# Patient Record
(demographics unavailable — no encounter records)

---

## 2024-11-18 NOTE — ASSESSMENT
[FreeTextEntry1] : ASHD\par  Hypertension\par  Hyperlipidemia\par  PVC's\par  APC's\par  Bladder cancer

## 2024-11-18 NOTE — PHYSICAL EXAM
[General Appearance - Well Developed] : well developed [Normal Appearance] : normal appearance [General Appearance - Well Nourished] : well nourished [General Appearance - In No Acute Distress] : no acute distress [Normal Conjunctiva] : the conjunctiva exhibited no abnormalities [Normal Oropharynx] : normal oropharynx [Normal Jugular Venous A Waves Present] : normal jugular venous A waves present [Respiration, Rhythm And Depth] : normal respiratory rhythm and effort [Exaggerated Use Of Accessory Muscles For Inspiration] : no accessory muscle use [Auscultation Breath Sounds / Voice Sounds] : lungs were clear to auscultation bilaterally [Heart Rate And Rhythm] : heart rate and rhythm were normal [Heart Sounds] : normal S1 and S2 [Arterial Pulses Normal] : the arterial pulses were normal [Edema] : no peripheral edema present [Abdomen Soft] : soft [Abdomen Tenderness] : non-tender [Abnormal Walk] : normal gait [Nail Clubbing] : no clubbing of the fingernails [Cyanosis, Localized] : no localized cyanosis [Skin Turgor] : normal skin turgor [] : no rash [Oriented To Time, Place, And Person] : oriented to person, place, and time [Affect] : the affect was normal

## 2024-11-18 NOTE — DISCUSSION/SUMMARY
[FreeTextEntry1] : Medical therapy. Patient's recent lab results were reviewed with him  Patient was instructed to target his T. Cholesterol to less than 200 mg/dl and LDL cholesterol to less than 70 mg/dl. He is at target goal with respect to his lipid levels. he was informed that his serum glucose is elevated at 114 mg/dl. Exercise and weight loss was advised. Maintain cardiac medications.  EKG was reviewed and interpreted with the patient. EKG: NSR rate of 756 bpm occasional APC's. Holter monitor for 3 days. results were reviewed with the patient. Maintain metoprolol ER at 50 mg QD RV in 6 months. [EKG obtained to assist in diagnosis and management of assessed problem(s)] : EKG obtained to assist in diagnosis and management of assessed problem(s)

## 2024-11-18 NOTE — REASON FOR VISIT
[FreeTextEntry1] : Patient presents for follow up and review of his BP after having his metoprolol dosage increased. He is here to review his recent lab test results as well.

## 2024-11-18 NOTE — HISTORY OF PRESENT ILLNESS
[FreeTextEntry1] : ASHD\par  Hypertension\par  Hyperlipidemia\par  Bladder cancer\par  PVC's\par  APC's

## 2025-01-06 NOTE — PHYSICAL EXAM
[Normal] : mucosa is normal [Midline] : trachea located in midline position [de-identified] : Bilateral obstructing and impacted cerumen removed under otomicroscopy with microinstrumentation.

## 2025-01-06 NOTE — REASON FOR VISIT
[Subsequent Evaluation] : a subsequent evaluation for [FreeTextEntry2] : cholesteatoma of left ear, atresia of external auditory canal

## 2025-01-06 NOTE — HISTORY OF PRESENT ILLNESS
[FreeTextEntry1] : Patient returns today c/o cholesteatoma of left ear, atresia of external auditory canal. Patient states he is here to get his ears cleaned out and is otherwise doing well. No further complaints

## 2025-01-28 NOTE — REASON FOR VISIT
[FreeTextEntry1] : Patient presents for Cardiology risk stratification prior to a Urology procedure at [facility] scheduled for 2/24/25. He denies any cardiac complaints.

## 2025-01-28 NOTE — DISCUSSION/SUMMARY
[EKG obtained to assist in diagnosis and management of assessed problem(s)] : EKG obtained to assist in diagnosis and management of assessed problem(s) [FreeTextEntry1] : Patient may proceed with his cystoscopy and urologic procedure as an intermediate risk patient. The patient represents an intermediate risk for a perioperative cardiac event representing a 5-7% risk for MI, CHF, arrhythmia, and 2% mortality risk. This was explained to him. EKG: NSR, rate of 70 bpm , no ischemic changes noted, no ectopy noted. Patient was instructed to target his T. Cholesterol to less than 200 mg/dl and LDL cholesterol to less than 70 mg/dl. He is at target goal with respect to his lipid levels. he was informed that his serum glucose is elevated at 114 mg/dl. Exercise and weight loss was advised. Maintain cardiac medications.  EKG was reviewed and interpreted with the patient. BMP, CBC, Fasting lipid panel, hepatic profile PT/PTT/INR is advised preoperatively Maintain metoprolol ER at 50 mg QD RV in 6 months.

## 2025-02-03 NOTE — ADDENDUM
[FreeTextEntry1] : Patient's note was transcribed with the assistance of a medical scribe under the supervision of Dr. Baker. I, Dr. Baker, have reviewed the patient's chart and agree that it aligns with my medical decisions. Faye Lackey, our scribe, also served as a chaperone for physical examination purposes.

## 2025-02-03 NOTE — REASON FOR VISIT
Patient called to inform author that their pharmacy did not receive new prescription MD had recommended. Patient informed author that MD had told patient to start the new prescription after their [facility] today. Author was unable to find the new prescription.    [Follow-up Visit ___] : a follow-up visit  for [unfilled]

## 2025-02-03 NOTE — HISTORY OF PRESENT ILLNESS
[FreeTextEntry1] : VALERIE MARRERO is an 88-year-old male, with history of glaucoma, who presents for consultation for BPH, worsening LUTS on Flomax and finasteride. Previous history of elevated PSA now appropriately responded to finasteride.  Pt is scheduled for TURBT at [facility].  States that he has had low-grade bladder cancer for many years and has had office fulgurations but most recently his tumor was close to the ureteral orifice and therefore is requiring TURBT.  Denies flank pain, gross hematuria, dysuria or associated symptoms.  Denies LUTS.  Labs 01/29/2025 Cr 0.9 GFR 82 Ca 9.4 K 4.5  previously  PVR 02/01/2024 - 48 cc.  He has previous history of elevated PSA which appropriately responded to finasteride 5 mg daily.  Last PSA was December 2021 and was 1.52 ng/mL.  Hx of Bladder Cancer. Followed by Urologist Dr. Gomez   renal/bladder ultrasound images demonstrated no evidence of renal masses, hydronephrosis, or renal calculi bilaterally. Prostate measures approximately 90 cc with a prevoid volume of 220 cc and a postvoid of 37 cc.  Urinalysis negative  Cr 0.9 11/2020  Denies  PMH including previous kidney stones, recurrent UTIs.  Family History: No  malignancies, No prostate Ca in the family  Social History: Former Smoker, wife is Erinn, passed from complicated related to dialysis catheters,. pts son passed from cancer same year  Old records reviewed 9/2019 PSA= 4.6 % free 28 11/2020 Creat =0.9  10/2020 UA = negative

## 2025-02-03 NOTE — ASSESSMENT
[FreeTextEntry1] : VALERIE MARRERO is an 88-year-old male, with history of glaucoma, who presents for consultation for BPH, worsening LUTS on Flomax and finasteride. Previous history of elevated PSA now appropriately responded to finasteride. LG bladder CA followed at [facility].  Pt is scheduled for TURBT at [facility] for LG bladder CA.  Explained importance of continuing prostate medications as he is at risk for urinary retention. - cont Flomax cont Finasteride for chronic BPH/ stable LUTS.  stabilization of chronically elevated psa. Denies side effects. - f/u with me PRN. Will follow-up earlier if pt has any worsening symptoms, hematuria etc.

## 2025-05-19 NOTE — REASON FOR VISIT
[FreeTextEntry1] : Patient presents for Cardiology risk stratification prior to a Urology procedure at [facility] scheduled for 2/24/25. He denies any cardiac complaints.